# Patient Record
Sex: MALE | Race: WHITE | Employment: FULL TIME | URBAN - METROPOLITAN AREA
[De-identification: names, ages, dates, MRNs, and addresses within clinical notes are randomized per-mention and may not be internally consistent; named-entity substitution may affect disease eponyms.]

---

## 2023-02-06 ENCOUNTER — OFFICE VISIT (OUTPATIENT)
Dept: SURGERY | Age: 33
End: 2023-02-06
Payer: COMMERCIAL

## 2023-02-06 VITALS
HEIGHT: 70 IN | DIASTOLIC BLOOD PRESSURE: 73 MMHG | BODY MASS INDEX: 40.44 KG/M2 | OXYGEN SATURATION: 99 % | WEIGHT: 282.5 LBS | HEART RATE: 74 BPM | TEMPERATURE: 97.8 F | SYSTOLIC BLOOD PRESSURE: 127 MMHG

## 2023-02-06 DIAGNOSIS — G89.29 CHRONIC LOW BACK PAIN, UNSPECIFIED BACK PAIN LATERALITY, UNSPECIFIED WHETHER SCIATICA PRESENT: ICD-10-CM

## 2023-02-06 DIAGNOSIS — M54.50 CHRONIC LOW BACK PAIN, UNSPECIFIED BACK PAIN LATERALITY, UNSPECIFIED WHETHER SCIATICA PRESENT: ICD-10-CM

## 2023-02-06 DIAGNOSIS — G43.909 MIGRAINE WITHOUT STATUS MIGRAINOSUS, NOT INTRACTABLE, UNSPECIFIED MIGRAINE TYPE: ICD-10-CM

## 2023-02-06 DIAGNOSIS — K58.9 IRRITABLE BOWEL SYNDROME, UNSPECIFIED TYPE: ICD-10-CM

## 2023-02-06 DIAGNOSIS — E66.01 MORBID OBESITY WITH BMI OF 40.0-44.9, ADULT (HCC): ICD-10-CM

## 2023-02-06 DIAGNOSIS — K21.9 GASTROESOPHAGEAL REFLUX DISEASE, UNSPECIFIED WHETHER ESOPHAGITIS PRESENT: Primary | ICD-10-CM

## 2023-02-06 DIAGNOSIS — E66.01 MORBID OBESITY (HCC): ICD-10-CM

## 2023-02-06 DIAGNOSIS — E78.00 HYPERCHOLESTEREMIA: ICD-10-CM

## 2023-02-06 PROBLEM — F32.A DEPRESSION: Status: ACTIVE | Noted: 2023-02-06

## 2023-02-06 PROBLEM — E55.9 HYPOVITAMINOSIS D: Status: ACTIVE | Noted: 2023-02-06

## 2023-02-06 PROBLEM — M54.9 CHRONIC BACK PAIN: Status: ACTIVE | Noted: 2023-02-06

## 2023-02-06 PROCEDURE — 99204 OFFICE O/P NEW MOD 45 MIN: CPT | Performed by: SPECIALIST

## 2023-02-06 RX ORDER — OMEPRAZOLE 40 MG/1
CAPSULE, DELAYED RELEASE ORAL
COMMUNITY
Start: 2023-01-28

## 2023-02-06 RX ORDER — VILAZODONE HYDROCHLORIDE 40 MG/1
TABLET ORAL
COMMUNITY
Start: 2023-01-18

## 2023-02-06 RX ORDER — ROSUVASTATIN CALCIUM 5 MG/1
TABLET, COATED ORAL
COMMUNITY
Start: 2023-01-28

## 2023-02-06 NOTE — PROGRESS NOTES
Bariatric Surgery Consultation    Subjective: The patient is a 28 y.o. obese male with a Body mass index is 40.53 kg/m². .  The patient is at his heaviest weight for the past 3 years. he has been overweight since age 24-20.     he has been considering surgery since last year. he desires surgery at this time because of multiple health concerns and their lifestyle issues which are hindered by their weight.   he has been referred by his family physician for evaluation and treatment of their obesity via surgical intervention. Riley Stanford has tried multiple diets in his lifetime   most recently tried physician supervised, behavior modification, unsupervised diets, and Atkins    Bariatric comorbidities present are   Patient Active Problem List   Diagnosis Code    Morbid obesity ([facility] E66.01    Morbid obesity with BMI of 40.0-44.9, adult ([facility] E66.01, Z68.41    Depression F32. A    Hypercholesteremia E78.00    GERD (gastroesophageal reflux disease) K21.9    Migraine G43.909    IBS (irritable bowel syndrome) K58.9    Chronic back pain M54.9, G89.29    Hypovitaminosis D E55.9       The patient is considering laparoscopic gastric bypass surgery for surgical weight loss due to their ineffective progress with medical forms of weight loss and the urging of their physicians   who care for their primary medical issues. The patient  now presents  for consideration for weight loss surgery understanding the benefits of this over a medical approach of weight loss as   was discussed in our presentation on weight loss surgery. They have discussed their plans both with their family and primary care physician who is in support of their pursuit of such. The patient   has not had health issues as of late and denies and gastrointestinal disturbances other than what is outlined below in their review of symptoms.  All of their prior evaluations available by both their   PCP's and specialists physicians have been reviewed today either in the Care Everywhere portal or scanned under the media tab. I have spent a large portion of my initial consultation today reviewing the patients current dietary habits which have contributed to their health issues and obesity. I have suggested to them personally a dietary regimen that they can initiate now to help with their status as it pertains to their weight. They understand that the most important aspect of their   journey through their weight loss endeavor will be their adherence to a new lifestyle of healthy eating behavior. They also understand that an adherence to an exercise program will not only   help with weight loss but is ultimately important in weight maintenance. The patients goal weight is 190lb. These goals are consistent with expected outcomes of their desired operation. his Medical goals are resolution of these health issues.       Patient Active Problem List    Diagnosis Date Noted    Morbid obesity ([facility] 2023    Morbid obesity with BMI of 40.0-44.9, adult ([facility] 2023    Depression 2023    Hypercholesteremia 2023    GERD (gastroesophageal reflux disease) 2023    Migraine 2023    IBS (irritable bowel syndrome) 2023    Chronic back pain 2023    Hypovitaminosis D 2023      Past Surgical History:   Procedure Laterality Date    HX ADENOIDECTOMY      HX TONSILLECTOMY      HX WISDOM TEETH EXTRACTION        Social History     Tobacco Use    Smoking status: Former     Packs/day: 1.00     Years: 10.00     Pack years: 10.00     Types: Cigarettes     Quit date:      Years since quittin.1    Smokeless tobacco: Never   Substance Use Topics    Alcohol use: Yes     Comment: Rarely      Family History   Problem Relation Age of Onset    Cancer Mother     High Cholesterol Mother     High Cholesterol Father     Heart Disease Father     High Cholesterol Maternal Grandmother     Heart Disease Maternal Grandmother     High Cholesterol Maternal Grandfather     Heart Disease Maternal Grandfather       Current Outpatient Medications   Medication Sig Dispense Refill    omeprazole (PRILOSEC) 40 mg capsule       rosuvastatin (CRESTOR) 5 mg tablet       vilazodone (VIIBRYD) 40 mg tab tablet        Allergies   Allergen Reactions    Latex Hives    Shellfish Containing Products Anaphylaxis    Imitrex [Sumatriptan Succinate] Unknown (comments)     Makes headaches worse, dizzy,           Review of Systems:            General - No history or complaints of unexpected fever, chills, or weight loss  Head/Neck - No history or complaints of headache, diplopia, dysphagia, hearing loss  Cardiac - No history or complaints of chest pain, palpitations, murmur, or shortness of breath  Pulmonary - No history or complaints of shortness of breath, productive cough, hemoptysis  Gastrointestinal - moderately severe reflux noted,no  abdominal pain, obstipation/constipation or blood per rectum  Genitourinary - No history or complaints of hematuria/dysuria, stress urinary incontinence symptoms, or renal lithiasis  Musculoskeletal - mild joint pain in their back,  no muscular weakness  Hematologic - No history or complaints of bleeding disorders,  No blood transfusions  Neurologic - No history or complaints of  migraine headaches, seizure activity, syncopal episodes, TIA or stroke  Integumentary - No history or complaints of rashes, abnormal nevi, skin cancer  Gynecological - n/a           Objective:   Visit Vitals  /73 (BP 1 Location: Right upper arm, BP Patient Position: Sitting, BP Cuff Size: Large adult long)   Pulse 74   Temp 97.8 °F (36.6 °C)   Ht 5' 10\" (1.778 m)   Wt 128.1 kg (282 lb 8 oz)   SpO2 99%   BMI 40.53 kg/m²       Physical Examination: General appearance - alert, well appearing, and in no distress and oriented to person, place, and time  Mental status - alert, oriented to person, place, and time, normal mood, behavior, speech, dress, motor activity, and thought processes  Eyes - pupils equal and reactive, extraocular eye movements intact, sclera anicteric, left eye normal, right eye normal  Ears - right ear normal, left ear normal  Neck - supple, no significant adenopathy  Lymphatics - no palpable lymphadenopathy, no hepatosplenomegaly  Chest - clear to auscultation, no wheezes, rales or rhonchi, symmetric air entry  Heart - normal rate, regular rhythm, normal S1, S2, no murmurs, rubs, clicks or gallops  Abdomen - soft, nontender, nondistended, no masses or organomegaly  Back exam - full range of motion, no tenderness, palpable spasm or pain on motion  Neurological - alert, oriented, normal speech, no focal findings or movement disorder noted  Musculoskeletal - no joint tenderness, deformity or swelling  Extremities - peripheral pulses normal, no pedal edema, no clubbing or cyanosis    Labs:     Lab Results   Component Value Date/Time    WBC 7.3 03/26/2012 08:05 PM    Hemoglobin (POC) 14.6 03/26/2012 08:12 PM    HGB 13.8 03/26/2012 08:05 PM    Hematocrit (POC) 43 03/26/2012 08:12 PM    HCT 42.0 03/26/2012 08:05 PM    PLATELET 028 32/11/1022 08:05 PM    MCV 71.4 (L) 03/26/2012 08:05 PM     No results found for: NA, K, CL, CO2, AGAP, GLU, BUN, CREA, BUCR, GFRAA, GFRNA, CA, TBIL, TBILI, AP, TP, ALB, GLOB, AGRAT, ALT  No results found for: IRON, FE, TIBC, IBCT, PSAT, FERR  No results found for: FOL, RBCF  No results found for: VITD3, XQVID2, XQVID3, XQVID, VD3RIA              Assessment:     Morbid obesity with associated comorbidity    Plan:     laparoscopic gastric bypass surgery    This is a 28 y.o. male with a BMI of Body mass index is 40.53 kg/m². and the weight-related co-morbidties . Dyllan Jarvis meets the NIH criteria for bariatric surgery based upon the BMI of Body mass index is 40.53 kg/m². and   multiple weight-related co-morbidties.  Dyllan Jarvis has elected laparoscopic gastric bypass as his intervention of choice for treatment of morbid obestiy through surgical means secondary   to its uniform results,  profound baseline suppression of hunger and pace at which weight is lost.    In the office today, following Diana's history and physical examination, a 30 minute discussion regarding the anatomic alterations for the laparoscopic gastric bypass  was undertaken. The dietary expectations and the patient  dependent factors for success were thoroughly discussed, to include the need for interval follow-up and long-term dietary changes associated with success. The possible short and long term  complications of the gastric bypass were also discussed, to include but not limited to;death, DVT/PE, staple line leak, bleeding, stricture formation, infection,internal   hernia  and pouch dilation. Specific weight related outcomes for success were also discussed with an emphasis on careful and close follow-up with the first year and dietary behavior modification over   the first years as baseline cyclical hunger returns  The patient expressed an understanding of the above factors, and his questions were answered in their entirety. In addition, the patient watched a seminar regarding obesity, surgical weight loss including, adjustable gastric band, gastric bypass, and sleeve gastrectomy. This discussion contrasted the different surgical techniques, mechanisms of actions and expected outcomes, and surgical and medical risks associated with each procedure. In office today we had a long question and answer session where each questions was answered until there were no additional questions. Today, the patient had all of his questions answered and desires to proceed with  bariatric surgery initially choosing the gastric bypass as his surgical option. Secondary Diagnoses:     Dietary Intervention  - The patient is currently scheduled to see or has been followed by a bariatric nutritionist for an attempt at preoperative weight loss as has been dictated by their insurance carrier. They will be assessed at various times during their follow up to evaluate their progress depending on the length of time that is required once again by their carrier. I have explained the importance of preoperative weight loss and the benefits regarding lower surgical risk and also assisting the patient in reaching their weight loss goal. They understand also that they should participate in an exercise program to assist in this weight loss. Finally they understand there is a physiologic benefit from the standpoint of hepatic volume reduction and reduction of central visceral adiposity preoperatively. I have reiterated the importance of a low carbohydrate and high protein regimen to achieve their stated goal. I have reviewed their current eating behavior prior to this encounter and explained to them in an exhaustive fashion the appropriate diet that they should adhere to. They have been encouraged to loose weight pre operatively and understand it is our prerogative to cancel surgery or postpone their procedure in the event of significant weight gain. GERD -The patient understands that weight loss surgery is not a guaranteed cure for reflux disease but does understand the benefits that weight loss can have on reflux disease. They also understand that at the time of surgery the gastroesophageal junction will be evaluated for the presence of a diaphragmatic hernia. Hernias will be corrected always with the surgical procedure if possible and is deemed safe. The patient also understands that neither weight loss surgery nor repair of a diaphragmatic hernia repair guarantees the complete cessation of the disease. They also understand there is a possibility of recurrence of these hernias with a simple crural repair as is performed with these procedures. They understand they may have to continue their medications in the postoperative period.  They have a good understanding that the gastric bypass procedure is better suited to total resolution of this issue and that neither the Lap Band or sleeve gastrectomy is considered a curative procedure as it pertains to this diagnosis. Hyperlipidemia - The patient understands that studies show that almost all patient will realize an improvement in their lipid profile with weight loss that occurs with these procedures. They however also understand that hyperlipidemia is a multifactorial disease particularly as it pertains to their genetic background and that there is no guarantee toward cure  of this   issue. We will resume their medications both pre operatively and immediately postoperatively as this tends to decrease any post operative cardiac events. The patient will follow up   with their family physician in the postoperative period with plans to repeat their lipid panel 2-3 month postoperative for potential adjustment or removal of these medications.          Signed By: Mecca Martinez MD     February 6, 2023

## 2023-02-08 ENCOUNTER — HOSPITAL ENCOUNTER (OUTPATIENT)
Age: 33
Setting detail: OUTPATIENT SURGERY
Discharge: HOME | End: 2023-02-08
Attending: SPECIALIST | Admitting: SPECIALIST
Payer: COMMERCIAL

## 2023-02-08 ENCOUNTER — APPOINTMENT (OUTPATIENT)
Dept: GENERAL RADIOLOGY | Age: 33
End: 2023-02-08
Attending: SPECIALIST
Payer: COMMERCIAL

## 2023-02-08 ENCOUNTER — HOSPITAL ENCOUNTER (OUTPATIENT)
Dept: BARIATRICS/WEIGHT MGMT | Age: 33
Discharge: HOME | End: 2023-02-08

## 2023-02-08 VITALS
BODY MASS INDEX: 40.41 KG/M2 | TEMPERATURE: 98.1 F | DIASTOLIC BLOOD PRESSURE: 96 MMHG | HEIGHT: 70 IN | SYSTOLIC BLOOD PRESSURE: 152 MMHG | RESPIRATION RATE: 18 BRPM | OXYGEN SATURATION: 98 % | WEIGHT: 282.3 LBS | HEART RATE: 60 BPM

## 2023-02-08 VITALS — WEIGHT: 283.3 LBS | BODY MASS INDEX: 40.56 KG/M2 | HEIGHT: 70 IN

## 2023-02-08 DIAGNOSIS — K21.9 GASTROESOPHAGEAL REFLUX DISEASE, UNSPECIFIED WHETHER ESOPHAGITIS PRESENT: Primary | ICD-10-CM

## 2023-02-08 PROCEDURE — 74240 X-RAY XM UPR GI TRC 1CNTRST: CPT

## 2023-02-08 PROCEDURE — 74240 X-RAY XM UPR GI TRC 1CNTRST: CPT | Performed by: SPECIALIST

## 2023-02-08 PROCEDURE — 76040000019: Performed by: SPECIALIST

## 2023-02-08 PROCEDURE — 74011000250 HC RX REV CODE- 250: Performed by: SPECIALIST

## 2023-02-08 NOTE — PROGRESS NOTES
Medical Weight Loss Multi-Disciplinary Program    Patient's Name: Alina Khan Age: 28 y.o. YOB: 1990 Sex: male     Session # 1. Pt attended in-person class. Weight obtained in office. Date: 2/8/2023    Visit Vitals   5' 10\" (1.778 m)   Wt 128.5 kg (283 lb 4.8 oz)   BMI 40.65 kg/m²       Pounds Lost since last month: N/A Pounds Gained since last month: N/A       Starting Weight (Consult, 2/6/23): 282.5 lbs Previous Months Weight: N/A   Overall Pounds Lost: 0.8 lbs  Overall Pounds Gained: 0 lbs     Do you smoke? \"No nicotine anymore, sparse THC/CBD prn\"    Alcohol intake:  Number of drinks per week:  0    Class Guidelines    Guidelines are reviewed with patient at the start of every class. 1. Patient understands that weight loss trial classes must be consecutive. Patient understands if they miss a class, it is their responsibility to contact me to reschedule class. I will reach out to patient after their first no show. 2.  Patient understands the expectations that weight maintenance/weight loss is expected during the classes. Failure to demonstrate changes may result in extension of weight loss trial, followed by returning to see the surgeon. Patient understands that they CANNOT gain any weight during the weight loss trial.  Gaining weight will result in extension of weight loss trial.  3. Patient is also instructed to complete their labwork, psychological evaluation visit, and any other tests that the surgeon has ordered while they are working on their weight loss trial.  4.  Patient was instructed to bring their packet of nutrition education materials to every class and appointment. Eating Habits and Behaviors    Reviewed intake  Understanding low carbohydrates, low sugar, higher protein meals  Instruction given for personal dietary changes  Discussed perceived compliance    Comments: RD Reviewed Diet History and Physical Activity/Exercise habits.   Recommended dietary changes discussed for both before and after surgery. Today in class we reviewed the Key Diet Principles. Patient was encouraged to consume 3 meals each day, and meal timing was reviewed. Meal time behaviors that will help pt to be successful with their weight loss efforts were also discussed. We discussed the importance of drinking adequate amounts of fluids, recommending that patient consume a minimum of 64 oz of sugar-free, caffeine-free and carbonation-free fluids each day. Patient was encouraged to eliminate sugar-sweetened beverages such as sweet tea, fruit juice, fruit smoothies, flavored coffee drinks and regular sodas. During the weight loss trial, patient is encouraged to focus on eating protein-forward meals, with a daily goal of  grams protein. Patient was also advised to decrease carbohydrate intake to <100 g/d, choosing complex vs. simple carbohydrates in limited amounts. We also discussed limiting fat intake. Encouraged patient to follow the \"3-gram rule\" when choosing foods by looking for items containing <3 g of fat and sugar per serving. We reviewed meal planning guidelines and discussed appropriate meal and snack options. We also talked about appropriate protein drinks and patient was encouraged to start trying these, using them either for a meal replacement or a protein-rich snack pre-operatively. We reviewed the nutritional guidelines for selecting protein shakes. Pre-operative vitamin and mineral supplementation was reviewed. Patient was instructed to choose a chewable complete multivitamin with iron in NON-gummy form. Selection of probiotic was also reviewed. Comments: During today's class we talked about setting SMART goals. Patient was instructed on: The Stages of Readiness to Change  Goals for each stage of readiness to change. Key words associated with each stage of readiness to change.   Examples of aspects of pts diet to focus on and how to tailor the patients goals in these areas. Patient was given the guidelines of how to set SMART goals. Pt was given examples of barriers to change and ways to persist in the face of barriers. Patient's current diet habits include:  Patient is eating 2-3 meals and 1 snacks per day. Per dietary recall, pts diet has the following concerns:  Pt has found 1 protein supplement shakes for use post-op in meeting their protein needs. Pt is skipping meals. Pt is consuming 16-32 oz sugar-sweetened beverages daily. Pt is consuming caffeinated beverages in the form of: coffee. Pt is consuming foods high in carbohydrates, such as: breads, rice, potatoes, corn. Pt is consuming foods high in fat such as: nuts, pork. Patient's plan for dietary and/or behavior changes in the upcoming month: none noted    Physical Activity/Exercise    Comments: We talked about exercise. Patient was given reasons of why exercise is so important and how that can help with their long-term success. I have encouraged patient to get a support system to help with the activity. We talked about recommended types of physical activity, including walking, swimming, cycling, or chair exercises. I also talked with patient about doing some strength training, which helps the metabolism, as well as strengthen muscles and bones. Patient's plan to incorporate more activity includes: \"I will not just walk PM but AM as well\". Behavior Modification     Comments:  During today's class, I discussed vitamin and mineral supplementation essential for health and prevention of malnutrition in depth. Patient was directed to the handouts on vitamins and minerals found on pages 27-33 that were provided in class handouts packet as well as a handout titled, \"Nutrient Deficiency and it's symptoms\".   I reviewed the vitamins and minerals that need to be supplemented, dosage recommendations, functions of supplements and signs of deficiencies, as well as examples of specific supplements. Reviewed briefly the requirement  differences between laparoscopic gastric bypass, laparoscopic sleeve gastrectomy, and lap-band procedures, while encouraging all patients to continue supplements for life no matter what bariatric surgery they are preparing for. Goals: Work to increase to 3-4 small meals per day, with 1-2 planned snacks as needed. Recommend following the plate method for meal planning - focusing on lean protein, non-starchy vegetables, and measured amounts of complex carbohydrates. - Goal of  g protein and <100 g carbohydrates per day. - Select at least 2 DIFFERENT protein supplements that can be used for protein supplementation to meet goals pre- and post-operatively. - Practice Carbohydrate Counting and label reading.   - Follow 3 g rule for fat and sugar. 2. Slow down meals  - Chew each bite 25-35 times. - Aim for 20-30 min/meal.  3. Increase non-caloric fluids to 64 oz per day. Eliminate caffeine, added sugar, carbonation, and straws.               - Continue to work to decrease sugar sweetened beverages - goal of calorie-free beverages only. - Must eliminate caffeine prior to surgery and avoid for ~6-8 weeks. - Practice 30:30 rule,  food and fluid intake. 4. Start activity regimen, work to increase ADLs. 5. Start Complete MVI and probiotic at least 30 days pre-op. Candidate for surgery (per RD): PENDING, Pt scheduled for nutrition education on 3/2/23.

## 2023-02-08 NOTE — PROCEDURES
Patient:Diana Malik   : 1990  Medical Record Number:[id number]    PREPROCEDURE DIAGNOSIS: This patient is preoperative for laparoscopic gastric bypass surgery procedure with a history of  reflux disease. POSTPROCEDURE DIAGNOSIS: This patient is preoperative for laparoscopic gastric bypass surgery procedure with a history of  reflux disease. PROCEDURES PERFORMED: Upper GI study with barium. ESTIMATED BLOOD LOSS: None. SPECIMENS: None. STATEMENT OF MEDICAL NECESSITY: The patient is a patient with a  longstanding history of obesity. They are now considering the laparoscopic gastric bypass surgery procedure as a means of surgical weight control and due to their history of reflux disease and are being assessed preoperatively for such. DESCRIPTION OF PROCEDURE: The patient was brought to the fluoroscopy unit and  was given thin barium. On swallowing of barium, they were noted to have  normal peristalsis of their esophagus. They had prompt filling of distal  esophagus with tapering into the gastroesophageal junction. There was no evidence of a hiatal hernia present. Contrast then filled the gastric cardia, fundus,body and pre pyloric region with no abnormalities noted. Contrast then exited the pylorus in normal fashion. No obstruction was noted. There was no evidence of reflux noted.     (normal anatomy)    Eamon Nixon MD

## 2023-03-02 ENCOUNTER — HOSPITAL ENCOUNTER (OUTPATIENT)
Facility: HOSPITAL | Age: 33
Discharge: HOME | End: 2023-03-05

## 2023-03-02 VITALS — HEIGHT: 70 IN | BODY MASS INDEX: 41.43 KG/M2 | WEIGHT: 289.4 LBS

## 2023-03-02 NOTE — PROGRESS NOTES
Nutrition Note      Medical Weight Loss Multi-Disciplinary Program    Patient's Name: Claudia Raphael   Age: 28 y.o. YOB: 1990   Sex: male    Session# 2 . Pt attended in-person class. Weight obtained in office. Date: 3/2/2023    Vitals:    03/02/23 1630   Weight: 289 lb 6.4 oz (131.3 kg)   Height: 5' 10\" (1.778 m)      Body mass index is 41.52 kg/m². Pounds Lost since last month: 0.0 lbs        Pounds Gained since last month: 6.1 lbs    Starting Weight: 282.5 lbs    Previous Months Weight: 283.3 lbs  Overall Pounds Lost: 0.0 lbs    Overall Pounds Gained: 6.9 lbs    Do you smoke? no    Alcohol intake:  Number of drinks  per week: 0    Class Guidelines    Guidelines are reviewed with patient at the start of every class. 1. Patient understands that weight loss trial classes must be consecutive. Patient understands if they miss a class, it is their responsibility to contact me to reschedule class. I will reach out to patient after their first no show. 2.  Patient understands the expectations that weight maintenance/weight loss is expected during the classes. Failure to demonstrate changes may result in one extra month of weight loss trial, followed by going back to see the surgeon. Patient understands that they CAN NOT gain any weight during the weight loss trial.  Gaining weight will result in extra classes. 3. Patient is also instructed to be doing their labs, blood work, psych visit, support group and any other test that the surgeon has used while they are working on their weight loss trial.  4.  Patient was instructed to bring their blue folder to every class and appointment. Eating Habits and Behaviors    Today in class, we reviewed the key diet principles. Pt was encouraged to consume 3 meals each day; the timing the meals was reviewed. Meal time behaviors that will help pt to be successful with their weight loss efforts were additionally reviewed.      RD discussed the importance of adequate fluids, recommending that pt consume a minimum of 64 oz of sugar-free, caffeine-free and carbonation-free fluids each day. Patient was encouraged to cut out liquid calories, such as soda and sweet tea. In class, we also talked about focusing on protein and low carbohydrates. Patient was encouraged during the weight loss trial to keep their carbohydrate less than 100 grams per day and their protein level at  grams per day. We talked about meal choices and snack ideas. Pre-operative vitamin and mineral supplementation was reviewed. Pts were instructed to choose chewable complete multivitamin with iron in NON-gummy form. Selection of probiotic was explained. Patient was directed to the section on label reading in their blue folder. This section will assist them when planning meals/grocery shopping to pick more appropriate options. Patient's current diet habits include:  Patient is eating 3 meals and 3 snacks per day. Per dietary recall, pts diet has the following concerns:  Pt has found 1 protein supplement shakes for use post-op in meeting their protein needs. Pt is consuming sugar sweetened foods \"2-cee\" d/wk. Pt is consuming 16 oz sugar-sweetened beverages/day. Pt is consuming caffeinated beverages in the form of: coffee. Pt is consuming foods high in carbohydrates &/or fats, such as: Oreos, bread, preserves, butter, [place] fruit, nut and cheese snacks, fruit, and brown rice. Patient's plan for dietary and/or behavior changes in the upcoming month: none noted. Physical Activity/Exercise    Comments: We talked about exercise. Patient was given reasons of why exercise is so important and how that can help with their long-term success. I have encouraged patient to get a support system to help with the activity. Recommended that pt aim for 150 min/wk vigorous physical activity.     We talked about recommended types of physical activity, including walking, swimming, cycling, or chair exercises.  I also talked with patient about doing some strength training, which helps the metabolism, as well as strengthen muscles and bones.  Patient's plan to incorporate more activity includes: \"Utilize [employer] [facility] for more cardio\".       Behavior Modification       Comments:  During today's lesson, I also spent some time talking about behavior changes.  I talked to patients about selection of foods using the food label.  Reviewed the different parts of the Food Label, and parts to focus on while selecting foods.  In depth discussion was provided and goals reviewed for: protein, fat and carbohydrates using the serving size, as well as ingredients to watch out for in the ingredients list when selecting bariatric-friendly food options.  Pt was encouraged to follow the 3 gram rule.  In depth instruction was provided over the components of the nutrition label to assist pts in feeling confident when selecting foods.  The food exceptions to the label rules were explained in detail.  Also reviewed, were label claims and what these mean to the patient selecting food both pre- and post-operatively.      Goals:   Work to increase to 3-4 small meals per day, with 1-2 planned snacks as needed.  Recommend following plate method for meal planning - focusing on lean protein, non-starchy vegetables, and measured amounts of starch.   - Goal of  g protein and  g carbohydrate per day.               - Select at least 2 DIFFERENT protein supplements that can be used for protein supplementation to meet goals pre- and post-operatively.   -Practice Carbohydrate Counting and label reading   -Follow 3 g rule for fat and sugar.  2. Slow down meals  - Chew each bite 25-35 times.  -Aim for 20-30 min/meal.  3. Increase non caloric fluid to 64 oz per day.  Eliminate caffeine, added sugar, carbonation, and straws.               -Continue to work to decrease sugar sweetened beverages - goal of  calorie free beverages only              -Must eliminate caffeine prior to surgery and avoid for ~6-8 weeks   -Practice 30:30 rule,  food and flood   4. Start activity regimen, work to increase ADL  5. Start Complete MVI and probiotic at least 30 days pre-op. Candidate for surgery (per RD): PENDING, Pt scheduled for nutrition education on 4/6/23.     Electronically signed by Bismark Overton RD on 3/2/23 at 4:32 PM EST

## 2023-05-31 ENCOUNTER — OFFICE VISIT (OUTPATIENT)
Age: 33
End: 2023-05-31
Payer: COMMERCIAL

## 2023-05-31 VITALS
TEMPERATURE: 97.2 F | SYSTOLIC BLOOD PRESSURE: 134 MMHG | OXYGEN SATURATION: 99 % | WEIGHT: 291 LBS | HEART RATE: 69 BPM | DIASTOLIC BLOOD PRESSURE: 80 MMHG | HEIGHT: 70 IN | BODY MASS INDEX: 41.66 KG/M2

## 2023-05-31 DIAGNOSIS — F32.A DEPRESSION, UNSPECIFIED DEPRESSION TYPE: ICD-10-CM

## 2023-05-31 DIAGNOSIS — K58.9 IRRITABLE BOWEL SYNDROME, UNSPECIFIED TYPE: ICD-10-CM

## 2023-05-31 DIAGNOSIS — M54.50 CHRONIC LOW BACK PAIN, UNSPECIFIED BACK PAIN LATERALITY, UNSPECIFIED WHETHER SCIATICA PRESENT: ICD-10-CM

## 2023-05-31 DIAGNOSIS — G89.29 CHRONIC LOW BACK PAIN, UNSPECIFIED BACK PAIN LATERALITY, UNSPECIFIED WHETHER SCIATICA PRESENT: ICD-10-CM

## 2023-05-31 DIAGNOSIS — E55.9 HYPOVITAMINOSIS D: ICD-10-CM

## 2023-05-31 DIAGNOSIS — G43.909 MIGRAINE WITHOUT STATUS MIGRAINOSUS, NOT INTRACTABLE, UNSPECIFIED MIGRAINE TYPE: ICD-10-CM

## 2023-05-31 DIAGNOSIS — K21.9 GASTROESOPHAGEAL REFLUX DISEASE, UNSPECIFIED WHETHER ESOPHAGITIS PRESENT: ICD-10-CM

## 2023-05-31 DIAGNOSIS — E66.01 MORBID OBESITY WITH BMI OF 40.0-44.9, ADULT (HCC): ICD-10-CM

## 2023-05-31 DIAGNOSIS — E66.01 MORBID OBESITY (HCC): Primary | ICD-10-CM

## 2023-05-31 DIAGNOSIS — E78.00 HYPERCHOLESTEREMIA: ICD-10-CM

## 2023-05-31 PROCEDURE — 1036F TOBACCO NON-USER: CPT | Performed by: SPECIALIST

## 2023-05-31 PROCEDURE — G8417 CALC BMI ABV UP PARAM F/U: HCPCS | Performed by: SPECIALIST

## 2023-05-31 PROCEDURE — 99214 OFFICE O/P EST MOD 30 MIN: CPT | Performed by: SPECIALIST

## 2023-05-31 PROCEDURE — G8427 DOCREV CUR MEDS BY ELIG CLIN: HCPCS | Performed by: SPECIALIST

## 2023-05-31 RX ORDER — OMEPRAZOLE 40 MG/1
CAPSULE, DELAYED RELEASE ORAL
COMMUNITY
Start: 2023-05-05

## 2023-05-31 RX ORDER — VITAMIN B COMPLEX
1 CAPSULE ORAL DAILY
COMMUNITY

## 2023-05-31 RX ORDER — ROSUVASTATIN CALCIUM 5 MG/1
TABLET, COATED ORAL
COMMUNITY
Start: 2023-05-05

## 2023-05-31 RX ORDER — CHOLECALCIFEROL (VITAMIN D3) 1250 MCG
CAPSULE ORAL
COMMUNITY

## 2023-05-31 RX ORDER — VILAZODONE HYDROCHLORIDE 40 MG/1
TABLET ORAL
COMMUNITY
Start: 2023-04-10

## 2023-05-31 ASSESSMENT — PATIENT HEALTH QUESTIONNAIRE - PHQ9
SUM OF ALL RESPONSES TO PHQ9 QUESTIONS 1 & 2: 0
1. LITTLE INTEREST OR PLEASURE IN DOING THINGS: 0
SUM OF ALL RESPONSES TO PHQ QUESTIONS 1-9: 0
2. FEELING DOWN, DEPRESSED OR HOPELESS: 0
SUM OF ALL RESPONSES TO PHQ QUESTIONS 1-9: 0

## 2023-05-31 NOTE — PROGRESS NOTES
Bariatric Surgery Preoperative Progress Note    Subjective:     Khanh Tirado is a 28 y.o. obese male with a Body mass index is 41.75 kg/m². .  he desires surgery at this time because of health issues and quality of life issues. Khanh Tirado has been seen by a bariatric nutritionist and has been placed on an appropriate low carbohydrate diet. The patient desires gastric bypass for surgical weight loss, however he is here today to review their workup to date. Khanh Tirado is here also today to check progress with weight loss / evaluate nutritional status and review all subspecialty clearances in hopes of proceeding to the operating room.      Patient Active Problem List    Diagnosis Date Noted    Morbid obesity ([facility] [address] 2023    Morbid obesity with BMI of 40.0-44.9, adult ([facility] 2023    Depression 2023    Hypercholesteremia 2023    GERD (gastroesophageal reflux disease) 2023    Migraine 2023    IBS (irritable bowel syndrome) 2023    Chronic back pain 2023    Hypovitaminosis D 2023      Past Surgical History:   Procedure Laterality Date    ADENOIDECTOMY      TONSILLECTOMY      WISDOM TOOTH EXTRACTION        Social History     Tobacco Use    Smoking status: Former     Packs/day: 1.00     Years: 10.00     Pack years: 10.00     Types: Cigarettes     Quit date: 2018     Years since quittin.4    Smokeless tobacco: Never   Substance Use Topics    Alcohol use: Yes      Family History   Problem Relation Age of Onset    Heart Disease Maternal Grandfather     Cancer Mother     High Cholesterol Mother     High Cholesterol Father     Heart Disease Father     High Cholesterol Maternal Grandmother     Heart Disease Maternal Grandmother     High Cholesterol Maternal Grandfather       Current Outpatient Medications   Medication Sig Dispense Refill    omeprazole (PRILOSEC) 40 MG delayed release capsule TAKE 1 CAPSULE BY MOUTH EVERY DAY BEFORE A MEAL      rosuvastatin

## 2023-05-31 NOTE — PATIENT INSTRUCTIONS
After Surgery: Any complete chewable, such as: Tangents Complete chewables. Avoid Tangent sours or gummies. They lack iron and other important nutrients and also have added sugar. Continue with chewable vitamin or change to adult complete multivitamin one month after surgery. Menstruating women can take a prenatal vitamin. Make sure has at least 18 mg iron and [id number] mcg folic acid):    Vitamin B12, B Complex Vitamin, and Biotin  Start taking within a month after surgery. Vitamin B12:  1000 mcg of Vitamin B12  at least three times weekly    Must take sublingually (meaning you take it under your tongue) or in a liquid drop form for easy absorption. B Complex Vitamin: Take a pill or liquid drop form once daily. Biotin: This vitamin can help prevent hair loss.     Recommend 5mg   (5000 mcg) a day  Biotin is Optional

## 2023-06-20 ENCOUNTER — HOSPITAL ENCOUNTER (OUTPATIENT)
Facility: HOSPITAL | Age: 33
Discharge: HOME | End: 2023-06-23

## 2023-06-20 VITALS — BODY MASS INDEX: 43.32 KG/M2 | HEIGHT: 70 IN | WEIGHT: 302.6 LBS

## 2023-06-20 NOTE — PROGRESS NOTES
Aim for 20-30 min/meal.  3. Increase non-caloric fluids to 64 oz per day. Eliminate caffeine, added sugar, carbonation, and straws.               - Continue to work to decrease sugar sweetened beverages - goal of calorie-free beverages only. - Must eliminate caffeine prior to surgery and avoid for ~6-8 weeks. - Practice 30:30 rule,  food and fluid intake. 4. Start activity regimen, work to increase ADLs. 5. Start Complete MVI and probiotic at least 30 days pre-op. Candidate for surgery (per RD): PENDING, Pt scheduled for nutrition education on 7/13/23.

## 2023-07-20 ENCOUNTER — HOSPITAL ENCOUNTER (OUTPATIENT)
Facility: HOSPITAL | Age: 33
Discharge: HOME | End: 2023-07-23

## 2023-07-20 VITALS — BODY MASS INDEX: 42.05 KG/M2 | WEIGHT: 293.7 LBS | HEIGHT: 70 IN

## 2023-08-29 ENCOUNTER — CLINICAL DOCUMENTATION (OUTPATIENT)
Facility: HOSPITAL | Age: 33
End: 2023-08-29

## 2023-08-29 NOTE — PROGRESS NOTES
Weight Loss Trial Class   NO SHOW     Patient's Name: Celeste Woflf  YOB: 1990     Patient did not show for weight loss trial class #3. This is patient's 4th missed class appointment. Action:     Yes      Patient was sent an e-mail to contact me to reschedule their appointments, if they are still interested in surgery. My contact information was provided. No        VM left for pt to contact me to reschedule their appointment, if they are still interested in surgery. My contact information was provided. Additional Comments:  Pts MB was full, could not leave a message.

## 2023-10-30 DIAGNOSIS — E66.01 MORBID OBESITY (HCC): Primary | ICD-10-CM

## 2023-10-30 DIAGNOSIS — Z01.812 PRE-OPERATIVE LABORATORY EXAMINATION: ICD-10-CM

## 2023-10-30 DIAGNOSIS — E66.01 MORBID OBESITY WITH BMI OF 40.0-44.9, ADULT (HCC): ICD-10-CM

## 2023-11-10 ENCOUNTER — TELEPHONE (OUTPATIENT)
Age: 33
End: 2023-11-10
